# Patient Record
Sex: MALE | Race: BLACK OR AFRICAN AMERICAN | NOT HISPANIC OR LATINO | URBAN - METROPOLITAN AREA
[De-identification: names, ages, dates, MRNs, and addresses within clinical notes are randomized per-mention and may not be internally consistent; named-entity substitution may affect disease eponyms.]

---

## 2021-11-22 ENCOUNTER — OTHER (OUTPATIENT)
Dept: URBAN - METROPOLITAN AREA CLINIC 99 | Facility: CLINIC | Age: 26
Setting detail: DERMATOLOGY
End: 2021-11-22

## 2021-11-22 DIAGNOSIS — Z48.02 ENCOUNTER FOR REMOVAL OF SUTURES: ICD-10-CM

## 2021-11-22 PROCEDURE — 99204 OFFICE O/P NEW MOD 45 MIN: CPT

## 2022-06-06 ENCOUNTER — APPOINTMENT (OUTPATIENT)
Dept: URBAN - METROPOLITAN AREA SURGERY 14 | Age: 27
Setting detail: DERMATOLOGY
End: 2022-06-07

## 2022-06-06 DIAGNOSIS — L40.0 PSORIASIS VULGARIS: ICD-10-CM

## 2022-06-06 DIAGNOSIS — Z79.899 OTHER LONG TERM (CURRENT) DRUG THERAPY: ICD-10-CM

## 2022-06-06 PROCEDURE — OTHER ORDER TESTS: OTHER

## 2022-06-06 PROCEDURE — OTHER HIGH RISK MEDICATION MONITORING: OTHER

## 2022-06-06 PROCEDURE — 99215 OFFICE O/P EST HI 40 MIN: CPT

## 2022-06-06 PROCEDURE — OTHER COUNSELING: OTHER

## 2022-06-06 PROCEDURE — OTHER PRESCRIPTION: OTHER

## 2022-06-06 RX ORDER — SECUKINUMAB 150 MG/ML
INJECTION SUBCUTANEOUS Q4WEEKS
Qty: 1 | Refills: 12 | Status: ERX | COMMUNITY
Start: 2022-06-06

## 2022-06-06 ASSESSMENT — LOCATION SIMPLE DESCRIPTION DERM
LOCATION SIMPLE: LEFT FOREARM
LOCATION SIMPLE: RIGHT PRETIBIAL REGION
LOCATION SIMPLE: LEFT PRETIBIAL REGION
LOCATION SIMPLE: RIGHT FOREARM

## 2022-06-06 ASSESSMENT — LOCATION DETAILED DESCRIPTION DERM
LOCATION DETAILED: LEFT VENTRAL PROXIMAL FOREARM
LOCATION DETAILED: RIGHT VENTRAL PROXIMAL FOREARM
LOCATION DETAILED: LEFT VENTRAL DISTAL FOREARM
LOCATION DETAILED: RIGHT PROXIMAL PRETIBIAL REGION
LOCATION DETAILED: LEFT PROXIMAL PRETIBIAL REGION

## 2022-06-06 ASSESSMENT — LOCATION ZONE DERM
LOCATION ZONE: ARM
LOCATION ZONE: LEG

## 2022-06-06 NOTE — PROCEDURE: HIGH RISK MEDICATION MONITORING
no decreased eating/drinking/no dizziness/no numbness/no weakness/no tingling Topical Ketoconazole Counseling: Patient counseled that this medication may cause skin irritation or allergic reactions.  In the event of skin irritation, the patient was advised to reduce the amount of the drug applied or use it less frequently.   The patient verbalized understanding of the proper use and possible adverse effects of ketoconazole.  All of the patient's questions and concerns were addressed.

## 2022-06-06 NOTE — PROCEDURE: HIGH RISK MEDICATION MONITORING
[facility] Clindamycin Counseling: I counseled the patient regarding use of clindamycin as an antibiotic for prophylactic and/or therapeutic purposes. Clindamycin is active against numerous classes of bacteria, including skin bacteria. Side effects may include nausea, diarrhea, gastrointestinal upset, rash, hives, yeast infections, and in rare cases, colitis.

## 2022-06-06 NOTE — HPI: PSORIASIS (PATIENT REPORTED)
Do You Have A Family History Of Psoriasis?: yes
Have You Been Diagnosed With Psoriatic Arthritis?: no
Where Is Your Psoriasis Located?: Limbs
Additional History: Pt states his psoriasis is beginning to flare up again and the Skyrizi is becoming less effective. Pt does not use topicals.

## 2022-06-06 NOTE — PROCEDURE: HIGH RISK MEDICATION MONITORING
Xelfriedaz Pregnancy And Lactation Text: This medication is Pregnancy Category D and is not considered safe during pregnancy.  The risk during breast feeding is also uncertain.

## 2022-06-06 NOTE — PROCEDURE: HIGH RISK MEDICATION MONITORING
Statement Selected Methotrexate Pregnancy And Lactation Text: This medication is Pregnancy Category X and is known to cause fetal harm. This medication is excreted in breast milk.

## 2022-06-06 NOTE — PROCEDURE: HIGH RISK MEDICATION MONITORING
RELEASE  [id number]    both hands    COLONOSCOPY      CORONARY ARTERY BYPASS GRAFT      DIAGNOSTIC CARDIAC CATH LAB PROCEDURE      ENDOSCOPY, COLON, DIAGNOSTIC      FRACTURE SURGERY      Right arm Fx    FRACTURE SURGERY      Left finger Fx    HERNIA REPAIR      Bilateral    KIDNEY STONE SURGERY      open    NECK SURGERY      SHOULDER ARTHROSCOPY      right and left shoulders    SKIN BIOPSY         Current Outpatient Medications   Medication Sig Dispense Refill    amLODIPine (NORVASC) 5 MG tablet Take 5 mg by mouth daily      apixaban (ELIQUIS) 5 MG TABS tablet Take 1 tablet by mouth 2 times daily 180 tablet 0    metoprolol succinate (TOPROL XL) 25 MG extended release tablet Take 1 tablet by mouth 2 times daily (Patient taking differently: Take 50 mg by mouth 2 times daily ) 60 tablet 0    furosemide (LASIX) 20 MG tablet TAKE ONE TABLET BY MOUTH EVERY DAY AS NEEDED (SWELLING). 30 tablet 5    olmesartan (BENICAR) 20 MG tablet Take 1 tablet by mouth daily (Patient taking differently: Take 40 mg by mouth daily ) 30 tablet 3    omeprazole (PRILOSEC) 20 MG delayed release capsule Take 1 capsule by mouth daily 90 capsule 1    allopurinol (ZYLOPRIM) 300 MG tablet Take 1 tablet by mouth daily 90 tablet 3    Alpha-Lipoic Acid 100 MG CAPS Take by mouth      BIOTIN PO Take by mouth      Ascorbic Acid (VITAMIN C ER PO) Take by mouth      pitavastatin (LIVALO) 2 MG TABS tablet Take 1 tablet by mouth nightly 90 tablet 3    Coenzyme Q10 (CO Q 10) 100 MG CAPS Take 2 tablets by mouth daily      Cholecalciferol (VITAMIN D3) 2000 UNITS TABS Take 1 tablet by mouth daily      Multiple Vitamins-Minerals (THERAPEUTIC MULTIVITAMIN-MINERALS) tablet Take 1 tablet by mouth daily.  aspirin 81 MG EC tablet Take 81 mg by mouth daily        No current facility-administered medications for this visit.         Allergies   Allergen Reactions    Iodine Swelling and Other (See Comments)     Reddened face and swelling of Cardiovascular: Negative for chest pain, palpitations and leg swelling. Gastrointestinal: Negative for abdominal pain, blood in stool, diarrhea, nausea and vomiting. Genitourinary: Negative for difficulty urinating, frequency and urgency. Musculoskeletal: Negative for arthralgias, back pain, joint swelling and myalgias. Skin: Negative for color change and rash. Allergic/Immunologic: Negative. Neurological: Negative for syncope, weakness, light-headedness and headaches. Hematological: Negative. Psychiatric/Behavioral: Negative for agitation, behavioral problems, confusion and self-injury. The patient is not nervous/anxious. All other systems reviewed and are negative. Physical Exam   Constitutional: He is oriented to person, place, and time. He appears well-developed and well-nourished. No distress. HENT:   Head: Normocephalic and atraumatic. Nose: Nose normal.   Mouth/Throat: Oropharynx is clear and moist.   Eyes: Pupils are equal, round, and reactive to light. Conjunctivae and EOM are normal.   Neck: Normal range of motion. Neck supple. No JVD present. No thyromegaly present. Cardiovascular: Normal rate and regular rhythm. Exam reveals no gallop and no friction rub. Murmur heard. Systolic murmur is present with a grade of 2/6. Pulmonary/Chest: Effort normal and breath sounds normal. No respiratory distress. He has no wheezes. Abdominal: Soft. Bowel sounds are normal. He exhibits no distension. There is no tenderness. There is no rebound and no guarding. Musculoskeletal: Normal range of motion. Lymphadenopathy:     He has no cervical adenopathy. Neurological: He is alert and oriented to person, place, and time. He has normal reflexes. No cranial nerve deficit. He exhibits normal muscle tone. Coordination normal.   Skin: Skin is warm and dry. No rash noted. No erythema. Psychiatric: He has a normal mood and affect.  His behavior is normal. Judgment normal.   Nursing Eucrisa Counseling: Patient may experience a mild burning sensation during topical application. Eucrisa is not approved in children less than 3 months of age.

## 2022-06-15 ENCOUNTER — RX ONLY (RX ONLY)
Age: 27
End: 2022-06-15

## 2022-06-15 RX ORDER — SECUKINUMAB 150 MG/ML
INJECTION SUBCUTANEOUS
Qty: 1 | Refills: 11 | Status: ERX | COMMUNITY
Start: 2022-06-15

## 2022-11-02 ENCOUNTER — RX ONLY (RX ONLY)
Age: 27
End: 2022-11-02

## 2022-11-02 RX ORDER — GUSELKUMAB 100 MG/ML
INJECTION SUBCUTANEOUS
Qty: 1 | Refills: 6 | Status: ERX | COMMUNITY
Start: 2022-11-02

## 2022-11-02 RX ORDER — GUSELKUMAB 100 MG/ML
INJECTION SUBCUTANEOUS
Qty: 2 | Refills: 0 | Status: ERX

## 2022-12-20 ENCOUNTER — RX ONLY (RX ONLY)
Age: 27
End: 2022-12-20

## 2022-12-20 RX ORDER — GUSELKUMAB 100 MG/ML
INJECTION SUBCUTANEOUS
Qty: 1 | Refills: 0 | Status: ERX

## 2023-04-27 ENCOUNTER — RX ONLY (RX ONLY)
Age: 28
End: 2023-04-27

## 2023-04-27 RX ORDER — GUSELKUMAB 100 MG/ML
INJECTION SUBCUTANEOUS
Qty: 1 | Refills: 11 | Status: ERX

## 2023-08-02 NOTE — PROCEDURE: HIGH RISK MEDICATION MONITORING
well developed, well nourished , in no acute distress , ambulating without difficulty , normal communication ability Erivedge Counseling- I discussed with the patient the risks of Erivedge including but not limited to nausea, vomiting, diarrhea, constipation, weight loss, changes in the sense of taste, decreased appetite, muscle spasms, and hair loss.  The patient verbalized understanding of the proper use and possible adverse effects of Erivedge.  All of the patient's questions and concerns were addressed.